# Patient Record
(demographics unavailable — no encounter records)

---

## 2025-07-02 NOTE — PLAN
[Early Intervention services reviewed with parent.  Services provided are inappropriate for this child's delays.  Recommendations for increase in services include:] : Early Intervention services reviewed with parent.  Services provided are inappropriate for this child's delays.  Recommendations for increase in services include: [___ days per week] : [unfilled] days per week [Safety counseling given regarding major safety issues for children this age.] : Safety counseling given regarding major safety issues for children this age. [Reading daily was encouraged.] : Reading daily was encouraged.  [Avoid choking hazards such as peanuts, hot dogs, un-cut grapes, hot dogs, peanut butter, fruits with skins and balloons.] : Avoid choking hazards such as peanuts, hot dogs, un-cut grapes, hot dogs, peanut butter, fruits with skins and balloons.  [FreeTextEntry1] : Letter provided as well as letter to request autism evaluation RoR book given F/u 4-6months

## 2025-07-02 NOTE — REVIEW OF SYSTEMS
[Fever] : no fever [Eye Discharge] : no discharge [Rhinorrhea] : no rhinorrhea [Diarrhea] : no diarrhea [FreeTextEntry7] : occasional constipation

## 2025-07-02 NOTE — HISTORY OF PRESENT ILLNESS
[None] : None [Gestational Age: ___] : Gestational Age in Weeks: [unfilled] [Chronological Age: ___] : Chronological Age in Months: [unfilled] [Corrected Age: ___] : Corrected Age: [unfilled] [Early Intervention] : Early Intervention services [___ Times Weekly] : [unfilled] times weekly [Pediatric Surgery:___] : Pediatric Surgery: [unfilled] [de-identified] : Speech, questionable autism. 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 Chromosomal microarray 11/22/23 negative. [FreeTextEntry1] : Graduated PT recently [TextEntry] : . Mother is . Curious about when you consider a child on the spectrum.    Communication:  - Rosalia constantly makes "mmm mmm" sounds - No words but often says the first syllable of words and is consistent with the sounds meaning a specific thing " ba for ball, m for milk" - Says katerin specifically  - Can count almost up to 30 per parents using word approximations - Sings to herself. Speaks her own language - Mother thinks she is sometimes repeating scenes from [employer]y   - Follows one step commands inconsistently  - Screams when frustrated - Denies echolalia - Does not point  - Waves bye bye, Blows kisses, recently shakes her head no  - Brings items to parent  Socialization : - Interested in exploring her environment more than interacting with others - Poor response to name - Expresses different emotions - Enjoys interactions with parent e.g., will throw leg over crib ad wait for parent to say Don't do it, giggles and pulls in her leg     Behavior:  - Has not had opportunity to elope  - Minimal tantrums usually related to fatigue or hunger  - Engages in pretend play, e.g., talking on the phone, feeding doll   - Likes to carry around items.  (watermelon and chicken toy specifically)  Restricted/Repetitive Interests or Behaviors:   - Toe walks - Denies lining up items or sorting items  - Denies body rocking, hand flapping, finger movements - Enjoys spinning self   - Questionable twisting palm back and forth when excited     Sensory:  - Ok with loud sounds but does not like car horns.  - Becomes upset with yelling and will cry or yell - Does not look out corner of eyes or hold things close to her face - Mouths inedible items - Denies olfactory, gustatory or tactile preferences or aversions  Diet: - Eats everything. tries things - Occasional constipation, alleviated with prune juice   Sleep - Often fights going to sleep but sleeps through the night with occasional night time awakening  - Sleeps 7:30/8:30pm- 8a  Other:  - Family moved to [place] this week. Will be converting therapists over. However driving back to [place] in the mean time to reduce the lag in services  Planning to work on toilet training this summer

## 2025-07-02 NOTE — REASON FOR VISIT
[Follow-Up ] : a  follow-up for [Parents] : parents [FreeTextEntry3] :  Developmental follow up 2/2 prematurity. Due to patient age, caregiver history required for comprehensive evaluation. Patient was referred from the primary medical team for enhanced developmental surveillance because of medical history.

## 2025-07-02 NOTE — BIRTH HISTORY
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

## 2025-07-02 NOTE — PHYSICAL EXAM
[Walk Alone] : walks alone [Finger Feeding] : finger feeding  [Understands "No"] : understands "No" [Points To Body Part] : points to body parts  ["Ihsan" Appropriately] : says "Ihsan" appropriately [1 Word Other Than Ma/Da] : uses 1 word other than ma/da [Vocabulary Of ___ Words] : has a vocabulary of [unfilled] words [Normal] : regular rate and rhythm; normal S1 and S2; no murmur [1 Step Command with Gesture] : does not follow 1 step commands with gesture [1 Step Command without Gesture] : does not follow 1 step commands without gesture [2 Step Commands] : does not follow 2 step commands ["Mama" Appropriately] : says "Mama" inappropriately [de-identified] : NAAT, pupils equal round and reactive to light, anicteric, normal external ear anatomy, nares patent with no discharge, throat supple  [de-identified] :  soft, non tender, non distended  [de-identified] : hypotonia in lower extremities [TextEntry] : - Speech heard included 'bye' imitation after sister, singing in her own language, and 'mmm' vocalizations - Intermittent eye contact with some staring

## 2025-07-02 NOTE — HISTORY OF PRESENT ILLNESS
[None] : None [Gestational Age: ___] : Gestational Age in Weeks: [unfilled] [Chronological Age: ___] : Chronological Age in Months: [unfilled] [Corrected Age: ___] : Corrected Age: [unfilled] [Early Intervention] : Early Intervention services [___ Times Weekly] : [unfilled] times weekly [Pediatric Surgery:___] : Pediatric Surgery: [unfilled] [de-identified] : Speech, questionable autism. 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 Chromosomal microarray 11/22/23 negative. [FreeTextEntry1] : Graduated PT recently [TextEntry] : . Mother is . Curious about when you consider a child on the spectrum.    Communication:  - Rosalia constantly makes "mmm mmm" sounds - No words but often says the first syllable of words and is consistent with the sounds meaning a specific thing " ba for ball, m for milk" - Says katerin specifically  - Can count almost up to 30 per parents using word approximations - Sings to herself. Speaks her own language - Mother thinks she is sometimes repeating scenes from Bluey   - Follows one step commands inconsistently  - Screams when frustrated - Denies echolalia - Does not point  - Waves bye bye, Blows kisses, recently shakes her head no  - Brings items to parent  Socialization : - Interested in exploring her environment more than interacting with others - Poor response to name - Expresses different emotions - Enjoys interactions with parent e.g., will throw leg over crib ad wait for parent to say Don't do it, giggles and pulls in her leg     Behavior:  - Has not had opportunity to elope  - Minimal tantrums usually related to fatigue or hunger  - Engages in pretend play, e.g., talking on the phone, feeding doll   - Likes to carry around items.  (watermelon and chicken toy specifically)  Restricted/Repetitive Interests or Behaviors:   - Toe walks - Denies lining up items or sorting items  - Denies body rocking, hand flapping, finger movements - Enjoys spinning self   - Questionable twisting palm back and forth when excited     Sensory:  - Ok with loud sounds but does not like car horns.  - Becomes upset with yelling and will cry or yell - Does not look out corner of eyes or hold things close to her face - Mouths inedible items - Denies olfactory, gustatory or tactile preferences or aversions  Diet: - Eats everything. tries things - Occasional constipation, alleviated with prune juice   Sleep - Often fights going to sleep but sleeps through the night with occasional night time awakening  - Sleeps 7:30/8:30pm- 8a  Other:  - Family moved to [place] this week. Will be converting therapists over. However driving back to [place] in the mean time to reduce the lag in services  Planning to work on toilet training this summer

## 2025-07-02 NOTE — PHYSICAL EXAM
[Walk Alone] : walks alone [Finger Feeding] : finger feeding  [Understands "No"] : understands "No" [Points To Body Part] : points to body parts  ["Ihsan" Appropriately] : says "Ihsan" appropriately [1 Word Other Than Ma/Da] : uses 1 word other than ma/da [Vocabulary Of ___ Words] : has a vocabulary of [unfilled] words [Normal] : regular rate and rhythm; normal S1 and S2; no murmur [1 Step Command with Gesture] : does not follow 1 step commands with gesture [1 Step Command without Gesture] : does not follow 1 step commands without gesture [2 Step Commands] : does not follow 2 step commands ["Mama" Appropriately] : says "Mama" inappropriately [de-identified] : NAAT, pupils equal round and reactive to light, anicteric, normal external ear anatomy, nares patent with no discharge, throat supple  [de-identified] :  soft, non tender, non distended  [de-identified] : hypotonia in lower extremities [TextEntry] : - Speech heard included 'bye' imitation after sister, singing in her own language, and 'mmm' vocalizations - Intermittent eye contact with some staring